# Patient Record
Sex: FEMALE | Race: WHITE | NOT HISPANIC OR LATINO | Employment: OTHER | URBAN - METROPOLITAN AREA
[De-identification: names, ages, dates, MRNs, and addresses within clinical notes are randomized per-mention and may not be internally consistent; named-entity substitution may affect disease eponyms.]

---

## 2017-01-11 ENCOUNTER — RECORDS - HEALTHEAST (OUTPATIENT)
Dept: LAB | Facility: CLINIC | Age: 82
End: 2017-01-11

## 2017-01-11 LAB
AST SERPL W P-5'-P-CCNC: 23 U/L (ref 0–40)
CHOLEST SERPL-MCNC: 134 MG/DL
FASTING STATUS PATIENT QL REPORTED: ABNORMAL
HDLC SERPL-MCNC: 39 MG/DL
LDLC SERPL CALC-MCNC: 73 MG/DL
TRIGL SERPL-MCNC: 110 MG/DL

## 2017-02-27 ENCOUNTER — HOSPITAL ENCOUNTER (OUTPATIENT)
Dept: MAMMOGRAPHY | Facility: HOSPITAL | Age: 82
Discharge: HOME | End: 2017-02-27
Attending: OBSTETRICS & GYNECOLOGY

## 2017-02-27 DIAGNOSIS — Z12.31 VISIT FOR SCREENING MAMMOGRAM: ICD-10-CM

## 2017-07-10 ENCOUNTER — RECORDS - HEALTHEAST (OUTPATIENT)
Dept: LAB | Facility: CLINIC | Age: 82
End: 2017-07-10

## 2017-07-10 LAB
CHOLEST SERPL-MCNC: 134 MG/DL
FASTING STATUS PATIENT QL REPORTED: ABNORMAL
HDLC SERPL-MCNC: 33 MG/DL
LDLC SERPL CALC-MCNC: 78 MG/DL
TRIGL SERPL-MCNC: 116 MG/DL

## 2017-08-18 ENCOUNTER — RECORDS - HEALTHEAST (OUTPATIENT)
Dept: LAB | Facility: CLINIC | Age: 82
End: 2017-08-18

## 2017-08-18 LAB
CHOLEST SERPL-MCNC: 184 MG/DL
FASTING STATUS PATIENT QL REPORTED: ABNORMAL
HDLC SERPL-MCNC: 35 MG/DL
LDLC SERPL CALC-MCNC: 112 MG/DL
TRIGL SERPL-MCNC: 183 MG/DL

## 2017-10-13 ENCOUNTER — RECORDS - HEALTHEAST (OUTPATIENT)
Dept: LAB | Facility: CLINIC | Age: 82
End: 2017-10-13

## 2017-10-13 LAB
CHOLEST SERPL-MCNC: 153 MG/DL
FASTING STATUS PATIENT QL REPORTED: ABNORMAL
HDLC SERPL-MCNC: 41 MG/DL
LDLC SERPL CALC-MCNC: 82 MG/DL
TRIGL SERPL-MCNC: 149 MG/DL

## 2018-01-16 ENCOUNTER — RECORDS - HEALTHEAST (OUTPATIENT)
Dept: LAB | Facility: CLINIC | Age: 83
End: 2018-01-16

## 2018-01-16 LAB
CHOLEST SERPL-MCNC: 131 MG/DL
FASTING STATUS PATIENT QL REPORTED: ABNORMAL
HDLC SERPL-MCNC: 42 MG/DL
LDLC SERPL CALC-MCNC: 64 MG/DL
TRIGL SERPL-MCNC: 126 MG/DL
TSH SERPL DL<=0.005 MIU/L-ACNC: 4.56 UIU/ML (ref 0.3–5)

## 2018-02-28 ENCOUNTER — HOSPITAL ENCOUNTER (OUTPATIENT)
Dept: MAMMOGRAPHY | Facility: HOSPITAL | Age: 83
Discharge: HOME | End: 2018-02-28
Attending: FAMILY MEDICINE

## 2018-02-28 DIAGNOSIS — Z12.31 VISIT FOR SCREENING MAMMOGRAM: ICD-10-CM

## 2018-08-08 ENCOUNTER — RECORDS - HEALTHEAST (OUTPATIENT)
Dept: LAB | Facility: CLINIC | Age: 83
End: 2018-08-08

## 2018-08-08 LAB
CHOLEST SERPL-MCNC: 147 MG/DL
FASTING STATUS PATIENT QL REPORTED: ABNORMAL
HDLC SERPL-MCNC: 41 MG/DL
LDLC SERPL CALC-MCNC: 74 MG/DL
TRIGL SERPL-MCNC: 160 MG/DL

## 2019-01-08 ENCOUNTER — RECORDS - HEALTHEAST (OUTPATIENT)
Dept: LAB | Facility: CLINIC | Age: 84
End: 2019-01-08

## 2019-01-08 LAB
ANION GAP SERPL CALCULATED.3IONS-SCNC: 12 MMOL/L (ref 5–18)
BUN SERPL-MCNC: 22 MG/DL (ref 8–28)
CALCIUM SERPL-MCNC: 9.7 MG/DL (ref 8.5–10.5)
CHLORIDE BLD-SCNC: 107 MMOL/L (ref 98–107)
CO2 SERPL-SCNC: 24 MMOL/L (ref 22–31)
CREAT SERPL-MCNC: 0.8 MG/DL (ref 0.6–1.1)
GFR SERPL CREATININE-BSD FRML MDRD: >60 ML/MIN/1.73M2
GLUCOSE BLD-MCNC: 123 MG/DL (ref 70–125)
MAGNESIUM SERPL-MCNC: 2.4 MG/DL (ref 1.8–2.6)
POTASSIUM BLD-SCNC: 4.3 MMOL/L (ref 3.5–5)
SODIUM SERPL-SCNC: 143 MMOL/L (ref 136–145)

## 2019-01-23 ENCOUNTER — RECORDS - HEALTHEAST (OUTPATIENT)
Dept: LAB | Facility: CLINIC | Age: 84
End: 2019-01-23

## 2019-01-23 LAB
CHOLEST SERPL-MCNC: 140 MG/DL
FASTING STATUS PATIENT QL REPORTED: YES
HDLC SERPL-MCNC: 43 MG/DL
LDLC SERPL CALC-MCNC: 67 MG/DL
TRIGL SERPL-MCNC: 152 MG/DL
TSH SERPL DL<=0.005 MIU/L-ACNC: 4.77 UIU/ML (ref 0.3–5)

## 2020-01-24 ENCOUNTER — RECORDS - HEALTHEAST (OUTPATIENT)
Dept: LAB | Facility: CLINIC | Age: 85
End: 2020-01-24

## 2020-01-24 LAB
ANION GAP SERPL CALCULATED.3IONS-SCNC: 8 MMOL/L (ref 5–18)
BUN SERPL-MCNC: 20 MG/DL (ref 8–28)
CALCIUM SERPL-MCNC: 9.5 MG/DL (ref 8.5–10.5)
CHLORIDE BLD-SCNC: 109 MMOL/L (ref 98–107)
CHOLEST SERPL-MCNC: 136 MG/DL
CO2 SERPL-SCNC: 28 MMOL/L (ref 22–31)
CREAT SERPL-MCNC: 0.79 MG/DL (ref 0.6–1.1)
FASTING STATUS PATIENT QL REPORTED: YES
GFR SERPL CREATININE-BSD FRML MDRD: >60 ML/MIN/1.73M2
GLUCOSE BLD-MCNC: 91 MG/DL (ref 70–125)
HDLC SERPL-MCNC: 41 MG/DL
LDLC SERPL CALC-MCNC: 63 MG/DL
POTASSIUM BLD-SCNC: 4.3 MMOL/L (ref 3.5–5)
SODIUM SERPL-SCNC: 145 MMOL/L (ref 136–145)
TRIGL SERPL-MCNC: 162 MG/DL
TSH SERPL DL<=0.005 MIU/L-ACNC: 3.7 UIU/ML (ref 0.3–5)

## 2020-02-03 ENCOUNTER — HOSPITAL ENCOUNTER (OUTPATIENT)
Dept: MAMMOGRAPHY | Facility: CLINIC | Age: 85
Discharge: HOME | End: 2020-02-03
Attending: FAMILY MEDICINE

## 2020-02-03 DIAGNOSIS — Z12.31 VISIT FOR SCREENING MAMMOGRAM: ICD-10-CM

## 2020-07-30 ENCOUNTER — RECORDS - HEALTHEAST (OUTPATIENT)
Dept: LAB | Facility: CLINIC | Age: 85
End: 2020-07-30

## 2020-07-30 LAB
CHOLEST SERPL-MCNC: 136 MG/DL
FASTING STATUS PATIENT QL REPORTED: YES
HDLC SERPL-MCNC: 41 MG/DL
LDLC SERPL CALC-MCNC: 62 MG/DL
TRIGL SERPL-MCNC: 163 MG/DL

## 2020-10-27 PROBLEM — M23.203 OLD COMPLEX TEAR OF MEDIAL MENISCUS OF RIGHT KNEE: Status: ACTIVE | Noted: 2020-10-27

## 2020-10-27 PROBLEM — G47.33 OBSTRUCTIVE SLEEP APNEA SYNDROME: Status: ACTIVE | Noted: 2020-10-27

## 2020-10-27 PROBLEM — Z85.43 HISTORY OF MALIGNANT NEOPLASM OF OVARY: Status: ACTIVE | Noted: 2020-10-27

## 2020-10-27 PROBLEM — R03.0 ELEVATED BLOOD PRESSURE READING WITHOUT DIAGNOSIS OF HYPERTENSION: Status: ACTIVE | Noted: 2020-10-27

## 2020-10-27 PROBLEM — L65.9 ALOPECIA: Status: ACTIVE | Noted: 2020-10-27

## 2020-10-27 PROBLEM — Z86.73 HISTORY OF STROKE WITHOUT RESIDUAL DEFICITS: Status: ACTIVE | Noted: 2020-10-27

## 2020-10-27 PROBLEM — G45.9 TIA (TRANSIENT ISCHEMIC ATTACK): Status: ACTIVE | Noted: 2019-01-03

## 2020-10-27 PROBLEM — M94.20 CHONDROMALACIA: Status: ACTIVE | Noted: 2020-10-27

## 2020-10-27 PROBLEM — M71.21 SYNOVIAL CYST OF POPLITEAL SPACE (BAKER), RIGHT KNEE: Status: ACTIVE | Noted: 2020-10-27

## 2020-10-27 PROBLEM — Z85.828 HISTORY OF SQUAMOUS CELL CARCINOMA OF SKIN: Status: ACTIVE | Noted: 2020-10-27

## 2020-10-27 PROBLEM — E78.2 MIXED HYPERLIPIDEMIA: Status: ACTIVE | Noted: 2020-10-27

## 2020-10-27 PROBLEM — B35.1 ONYCHOMYCOSIS: Status: ACTIVE | Noted: 2020-10-27

## 2020-10-27 PROBLEM — R41.82 AMS (ALTERED MENTAL STATUS): Status: ACTIVE | Noted: 2020-02-07

## 2020-10-27 PROBLEM — I35.9 AORTIC VALVE DISORDER: Status: ACTIVE | Noted: 2020-10-27

## 2020-10-27 PROBLEM — M81.0 SENILE OSTEOPOROSIS: Status: ACTIVE | Noted: 2020-10-27

## 2020-10-27 PROBLEM — E03.9 ACQUIRED HYPOTHYROIDISM: Status: ACTIVE | Noted: 2020-10-27

## 2020-10-27 SDOH — HEALTH STABILITY: MENTAL HEALTH: HOW OFTEN DO YOU HAVE 6 OR MORE DRINKS ON ONE OCCASION?: NOT ASKED

## 2020-10-27 SDOH — HEALTH STABILITY: MENTAL HEALTH: HOW OFTEN DO YOU HAVE A DRINK CONTAINING ALCOHOL?: MONTHLY OR LESS

## 2020-10-27 SDOH — HEALTH STABILITY: MENTAL HEALTH: HOW MANY STANDARD DRINKS CONTAINING ALCOHOL DO YOU HAVE ON A TYPICAL DAY?: NOT ASKED

## 2020-10-28 ENCOUNTER — VIRTUAL VISIT (OUTPATIENT)
Dept: NEUROLOGY | Facility: CLINIC | Age: 85
End: 2020-10-28
Payer: COMMERCIAL

## 2020-10-28 VITALS — BODY MASS INDEX: 25.11 KG/M2 | WEIGHT: 133 LBS | HEIGHT: 61 IN

## 2020-10-28 DIAGNOSIS — R03.0 ELEVATED BLOOD PRESSURE READING WITHOUT DIAGNOSIS OF HYPERTENSION: ICD-10-CM

## 2020-10-28 DIAGNOSIS — G45.4 TRANSIENT GLOBAL AMNESIA: Primary | ICD-10-CM

## 2020-10-28 PROCEDURE — 99214 OFFICE O/P EST MOD 30 MIN: CPT | Mod: 95 | Performed by: PSYCHIATRY & NEUROLOGY

## 2020-10-28 RX ORDER — ROSUVASTATIN CALCIUM 10 MG/1
10 TABLET, COATED ORAL
COMMUNITY
Start: 2019-01-11

## 2020-10-28 RX ORDER — LEVOTHYROXINE SODIUM 25 UG/1
25 TABLET ORAL
COMMUNITY

## 2020-10-28 RX ORDER — ASPIRIN 81 MG/1
81 TABLET, CHEWABLE ORAL DAILY
COMMUNITY

## 2020-10-28 RX ORDER — ALENDRONATE SODIUM 70 MG/1
TABLET ORAL
COMMUNITY
Start: 2020-09-15

## 2020-10-28 SDOH — HEALTH STABILITY: MENTAL HEALTH: HOW OFTEN DO YOU HAVE A DRINK CONTAINING ALCOHOL?: NOT ASKED

## 2020-10-28 ASSESSMENT — MIFFLIN-ST. JEOR: SCORE: 967.72

## 2020-10-28 NOTE — LETTER
"    10/28/2020         RE: Tiarra Alarcon  [address] [address]        Dear Colleague,    Thank you for referring your patient, Tiarra Alarcon, to the [facility] NEUROLOG[facility] [place]. Please see a copy of my visit note below.    Telephone follow-up visit requested by patient  Telephone follow-up visit due to the COVID-19 pandemic  Telephone number [phone]  Patient identified    .  The patient has been notified of following:     \"This telephone visit will be conducted via a call between you and your physician/provider. We have found that certain health care needs can be provided without the need for a physical exam. This service lets us provide the care you need with a short phone conversation. If a prescription is necessary we can send it directly to your pharmacy. If lab work is needed we can place an order for that and you can then stop by our lab to have the test done at a later time.    If during the course of the call the physician/provider feels a telephone visit is not appropriate, you will not be charged for this service.\"     Patient has given verbal consent for Telephone visit? Yes  Consent has been obtained for this service by 1 care team member: yes.              History of Present Illness     87-year-old seen in follow-up October 28, 2020 on a telephone follow-up visit    Patient had previous history of    1 patient had a TIA back in January 30, 2019 with a aphasia  2 past history of TGA in 2016  3 episode of hypertensive urgency with possible TGA February 7, 2020      Patient back in February had a transient episode where she had gone to the gym worked out drove home could not recall what she did was able to walk talk and do everything this would have been her second episode but she was also hypertensive at the time    She been tracking her blood pressure at home and says that it is good and she is not on any blood pressure medications     recent blood " pressures on October 2020 visit  137/76  131/72  134/75    Since last seen no hospitalizations no surgeries no major illnesses no falls or injuries no difficulty with memory recall eating okay    Current past medical history:    1. TIA on January 03, 2019, aphasia.  2. Hyperlipidemia.  3. Ovarian cancer post chemotherapy in 1992.  4. Transient global amnesia spell in the past. 2016  5. Hypertensive urgency with TGA February 2020  Habits:    a. Nonsmoker.  b. Does not drink alcohol.    Family history:    1. Sister with breast cancer, age 70.  2. Sister with cancer, age 89.  3. Daughter with breast cancer, age 57.    Review of workup during the January 2019 hospitalization:    1. MRI scan of the brain showed no acute stroke. There were some vessel changes seen.  2. CT scan of the head:  a. Diffuse volume loss, mild ex vacuo ventricular dilatation.  b. No bleed or hemorrhage or mass.  3. CTA of the intracranial vessels and neck vessels were unremarkable.  4. Echo 57% ejection fraction, mild mitral regurgitation, left atrium normal size.  5. HDL 39, LDL of 75.      Work-up Feb 2020  MRI scan brain  A. Mild atrophy and small vessel changes  B. Volume loss is a little bit more prominent in the parietal lobes  MRA [place] no significant stenosis  EKG normal sinus rhythm  Glucose 86        She has some mild anxiety which is chronic in nature and some difficulty with sleep.    Her mother lived to be 93, father lived to be 94. Sister lived to be 87.            Currently, she has:    1. Aphasia, TIA left hemisphere.  a. 81 mg of aspirin once per day.  b. Crestor 10 mg once per day.  c. Follow up blood pressure with her primary.  2. Some anxiety with maybe reactive blood pressure, which she should follow with her primary for, and keep tracking it at different times of the day.  3. History of thyroid disease, past history of ovarian cancer.  4. Previous transient global amnesia. 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)  Continue 81 mg of aspirin once per day  Continue Crestor 10 mg once per day  Blood pressure was good with home readings reviewed with patient  TGA x2 Review of Systems No headache no chest pain no shortness of breath no nausea vomiting no diarrhea no fever chills  No diplopia dysarthria dysphasia  No focal weakness numbness or tingling  No ataxia  No confusion or memory loss    Otherwise review of systems negative     Exam  Blood pressure 134/75 taken on home blood pressure cuff  Moving air well  No edema the feet  No abdominal pain    Neurologic exam  Alert oriented x3  No aphasia  No neglect  Memory recall good    Cranial nerves by report are normal  No focal weakness of the upper or lower extremities  Gait normal by report    Exam stable            Assessment/Plan   Brain 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 Aphasia, TIA left hemisphere.  a. 81 mg of aspirin once per day.  b. Crestor 10 mg once per day.  c. Follow up blood pressure with her primary.  2. Some anxiety with maybe reactive blood pressure, which she should follow with her primary for, and keep tracking it at different times of the day.  3. History of thyroid disease, past history of ovarian cancer.  4. Previous transient global amnesia. 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        Sincerely,        Kurt Rivers  MD [place]

## 2020-10-28 NOTE — PROGRESS NOTES
"Telephone follow-up visit requested by patient  Telephone follow-up visit due to the COVID-19 pandemic  Telephone number [phone]  Patient identified    .  The patient has been notified of following:     \"This telephone visit will be conducted via a call between you and your physician/provider. We have found that certain health care needs can be provided without the need for a physical exam. This service lets us provide the care you need with a short phone conversation. If a prescription is necessary we can send it directly to your pharmacy. If lab work is needed we can place an order for that and you can then stop by our lab to have the test done at a later time.    If during the course of the call the physician/provider feels a telephone visit is not appropriate, you will not be charged for this service.\"     Patient has given verbal consent for Telephone visit? Yes  Consent has been obtained for this service by 1 care team member: yes.              History of Present Illness     87-year-old seen in follow-up October 28, 2020 on a telephone follow-up visit    Patient had previous history of    1 patient had a TIA back in January 30, 2019 with a aphasia  2 past history of TGA in 2016  3 episode of hypertensive urgency with possible TGA February 7, 2020      Patient back in February had a transient episode where she had gone to the gym worked out drove home could not recall what she did was able to walk talk and do everything this would have been her second episode but she was also hypertensive at the time    She been tracking her blood pressure at home and says that it is good and she is not on any blood pressure medications     recent blood pressures on October 2020 visit  137/76  131/72  134/75    Since last seen no hospitalizations no surgeries no major illnesses no falls or injuries no difficulty with memory recall eating okay    Current past medical history:    1. TIA on January 03, 2019, aphasia.  2. " Hyperlipidemia.  3. Ovarian cancer post chemotherapy in 1992.  4. Transient global amnesia spell in the past. 2016  5. Hypertensive urgency with TGA February 2020  Habits:    a. Nonsmoker.  b. Does not drink alcohol.    Family history:    1. Sister with breast cancer, age 70.  2. Sister with cancer, age 89.  3. Daughter with breast cancer, age 57.    Review of workup during the January 2019 hospitalization:    1. MRI scan of the brain showed no acute stroke. There were some vessel changes seen.  2. CT scan of the head:  a. Diffuse volume loss, mild ex vacuo ventricular dilatation.  b. No bleed or hemorrhage or mass.  3. CTA of the intracranial vessels and neck vessels were unremarkable.  4. Echo 57% ejection fraction, mild mitral regurgitation, left atrium normal size.  5. HDL 39, LDL of 75.      Work-up Feb 2020  MRI scan brain  A. Mild atrophy and small vessel changes  B. Volume loss is a little bit more prominent in the parietal lobes  MRA [place] no significant stenosis  EKG normal sinus rhythm  Glucose 86        She has some mild anxiety which is chronic in nature and some difficulty with sleep.    Her mother lived to be 93, father lived to be 94. Sister lived to be 87.            Currently, she has:    1. Aphasia, TIA left hemisphere.  a. 81 mg of aspirin once per day.  b. Crestor 10 mg once per day.  c. Follow up blood pressure with her primary.  2. Some anxiety with maybe reactive blood pressure, which she should follow with her primary for, and keep tracking it at different times of the day.  3. History of thyroid disease, past history of ovarian cancer.  4. Previous transient global amnesia. 2016 with repeat spell February 2020        Assessment/Plan  Brain TIA (G46.0)  Continue 81 mg of aspirin once per day  Continue Crestor 10 mg once per day  Blood pressure was good with home readings reviewed with patient  TGA x2 Review of Systems No headache no chest pain no shortness of breath no nausea  vomiting no diarrhea no fever chills  No diplopia dysarthria dysphasia  No focal weakness numbness or tingling  No ataxia  No confusion or memory loss    Otherwise review of systems negative     Exam  Blood pressure 134/75 taken on home blood pressure cuff  Moving air well  No edema the feet  No abdominal pain    Neurologic exam  Alert oriented x3  No aphasia  No neglect  Memory recall good    Cranial nerves by report are normal  No focal weakness of the upper or lower extremities  Gait normal by report    Exam stable            Assessment/Plan   Brain 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 Aphasia, TIA left hemisphere.  a. 81 mg of aspirin once per day.  b. Crestor 10 mg once per day.  c. Follow up blood pressure with her primary.  2. Some anxiety with maybe reactive blood pressure, which she should follow with her primary for, and keep tracking it at different times of the day.  3. History of thyroid disease, past history of ovarian cancer.  4. Previous transient global amnesia. 2016 with repeat spell February 2020        Assessment/Plan  Brain TIA (G46.0)  Continue 81 mg of aspirin once per day  Continue Crestor 10 mg once per day      25 minutes of discussion time over the phone was carried out today as part of the visit

## 2020-10-28 NOTE — NURSING NOTE
Chief Complaint   Patient presents with     Follow Up     Follow up for TIA. Pt states she has been doing well. No concerns she would needs to discuss.     Phone visit     [phone]     Shannan Xiao LPN on 10/28/2020 at 9:49 AM

## 2021-01-25 ENCOUNTER — RECORDS - HEALTHEAST (OUTPATIENT)
Dept: LAB | Facility: CLINIC | Age: 86
End: 2021-01-25

## 2021-01-25 LAB
ANION GAP SERPL CALCULATED.3IONS-SCNC: 8 MMOL/L (ref 5–18)
BASOPHILS # BLD AUTO: 0.1 THOU/UL (ref 0–0.2)
BASOPHILS NFR BLD AUTO: 1 % (ref 0–2)
BUN SERPL-MCNC: 22 MG/DL (ref 8–28)
CALCIUM SERPL-MCNC: 9.2 MG/DL (ref 8.5–10.5)
CHLORIDE BLD-SCNC: 108 MMOL/L (ref 98–107)
CHOLEST SERPL-MCNC: 132 MG/DL
CO2 SERPL-SCNC: 27 MMOL/L (ref 22–31)
CREAT SERPL-MCNC: 0.79 MG/DL (ref 0.6–1.1)
EOSINOPHIL # BLD AUTO: 0.2 THOU/UL (ref 0–0.4)
EOSINOPHIL NFR BLD AUTO: 4 % (ref 0–6)
ERYTHROCYTE [DISTWIDTH] IN BLOOD BY AUTOMATED COUNT: 12.2 % (ref 11–14.5)
FASTING STATUS PATIENT QL REPORTED: YES
GFR SERPL CREATININE-BSD FRML MDRD: >60 ML/MIN/1.73M2
GLUCOSE BLD-MCNC: 99 MG/DL (ref 70–125)
HCT VFR BLD AUTO: 44.2 % (ref 35–47)
HDLC SERPL-MCNC: 39 MG/DL
HGB BLD-MCNC: 13.8 G/DL (ref 12–16)
IMM GRANULOCYTES # BLD: 0 THOU/UL
IMM GRANULOCYTES NFR BLD: 0 %
LDLC SERPL CALC-MCNC: 63 MG/DL
LYMPHOCYTES # BLD AUTO: 1.4 THOU/UL (ref 0.8–4.4)
LYMPHOCYTES NFR BLD AUTO: 25 % (ref 20–40)
MCH RBC QN AUTO: 30.1 PG (ref 27–34)
MCHC RBC AUTO-ENTMCNC: 31.2 G/DL (ref 32–36)
MCV RBC AUTO: 96 FL (ref 80–100)
MONOCYTES # BLD AUTO: 0.5 THOU/UL (ref 0–0.9)
MONOCYTES NFR BLD AUTO: 10 % (ref 2–10)
NEUTROPHILS # BLD AUTO: 3.4 THOU/UL (ref 2–7.7)
NEUTROPHILS NFR BLD AUTO: 61 % (ref 50–70)
PLATELET # BLD AUTO: 199 THOU/UL (ref 140–440)
PMV BLD AUTO: 10.1 FL (ref 8.5–12.5)
POTASSIUM BLD-SCNC: 4.5 MMOL/L (ref 3.5–5)
RBC # BLD AUTO: 4.59 MILL/UL (ref 3.8–5.4)
SODIUM SERPL-SCNC: 143 MMOL/L (ref 136–145)
TRIGL SERPL-MCNC: 152 MG/DL
TSH SERPL DL<=0.005 MIU/L-ACNC: 3.9 UIU/ML (ref 0.3–5)
WBC: 5.6 THOU/UL (ref 4–11)

## 2021-05-14 ENCOUNTER — OFFICE VISIT (OUTPATIENT)
Dept: NEUROLOGY | Facility: CLINIC | Age: 86
End: 2021-05-14
Payer: COMMERCIAL

## 2021-05-14 VITALS
DIASTOLIC BLOOD PRESSURE: 102 MMHG | WEIGHT: 137 LBS | HEART RATE: 76 BPM | BODY MASS INDEX: 26.9 KG/M2 | HEIGHT: 60 IN | SYSTOLIC BLOOD PRESSURE: 176 MMHG

## 2021-05-14 DIAGNOSIS — G45.9 TIA (TRANSIENT ISCHEMIC ATTACK): ICD-10-CM

## 2021-05-14 DIAGNOSIS — R03.0 ELEVATED BLOOD PRESSURE READING WITHOUT DIAGNOSIS OF HYPERTENSION: ICD-10-CM

## 2021-05-14 DIAGNOSIS — G45.4 TRANSIENT GLOBAL AMNESIA: Primary | ICD-10-CM

## 2021-05-14 PROCEDURE — 99214 OFFICE O/P EST MOD 30 MIN: CPT | Performed by: PSYCHIATRY & NEUROLOGY

## 2021-05-14 RX ORDER — ASPIRIN 81 MG
1 TABLET, DELAYED RELEASE (ENTERIC COATED) ORAL
COMMUNITY

## 2021-05-14 ASSESSMENT — MIFFLIN-ST. JEOR: SCORE: 972.93

## 2021-05-14 NOTE — LETTER
5/14/2021         RE: Tiarra Alarcon  [address] [address]        Dear Colleague,    Thank you for referring your patient, Tiarra Alarcon, to the [facility] NEUROLOGY [facility] [place]. Please see a copy of my visit note below.    In person evaluation  Patient accompanied by       History of Present Illness :  3/16/2020, in person visit  10/28/2020, telephone visit  5/14/2021, in person visit    88-year-old here for neurologic follow-up    Patient had previous history of:  1 patient had a TIA back in January 30, 2019 with a aphasia  2 past history of TGA in 2016  3 episode of hypertensive urgency with possible TGA February 7, 2020      Patient back in February 2020, had a transient episode where she had gone to the gym worked out drove home could not recall what she did was able to walk talk and do everything this would have been her second episode but she was also hypertensive at the time    She been tracking her blood pressure at home and says that it is good and she is not on any blood pressure medications     recent blood pressures on October 2020 visit  137/76  131/72  134/75    Since last seen no hospitalizations no surgeries no major illnesses no falls or injuries no difficulty with memory recall eating okay    Since last visit  No hospitalizations  No surgeries  No major illnesses  No Covid infection    She takes her blood pressure at home runs 126/70 goes up when she comes to the doctor's office          Current past medical history:    1. TIA on January 03, 2019, aphasia.  2. Hyperlipidemia.  3. Ovarian cancer post chemotherapy in 1992.  4. Transient global amnesia spell in the past. 2016  5. Hypertensive urgency with TGA February 2020  6. She has some mild anxiety which is chronic in nature and some difficulty with sleep.    Habits:  a. Nonsmoker.  b. Does not drink alcohol.    Family history:  1. Sister with breast cancer, age 70.  2. Sister with cancer, age  89.  3. Daughter with breast cancer, age 57.  4.Her mother lived to be 93, father lived to be 94. Sister lived to be 87.      Review of workup during the January 2019 hospitalization:    1. MRI scan of the brain showed no acute stroke. There were some vessel changes seen.  2. CT scan of the head:  a. Diffuse volume loss, mild ex vacuo ventricular dilatation.  b. No bleed or hemorrhage or mass.  3. CTA of the intracranial vessels and neck vessels were unremarkable.  4. Echo 57% ejection fraction, mild mitral regurgitation, left atrium normal size.  5. HDL 39, LDL of 75.      Work-up Feb 2020  MRI scan brain 2/7/2020  A. Mild atrophy and small vessel changes  B. Volume loss is a little bit more prominent in the parietal lobes  MRA [place] no significant stenosis  EKG normal sinus rhythm  Glucose 86     labs January 2021  Sodium 143 potassium 4.5, BUN 22 creatinine 0.79  Glucose 99  White blood count 5.6, hemoglobin 13.8, platelets 109,000  TSH 3.9  HDL 39/LDL 63                 exam    Review of systems  No headache no chest pain no shortness of breath no nausea vomiting no diarrhea no fever chills  No abdominal pain  No blood in the stool  No black tarry stool  No GERD    No diplopia dysarthria dysphagia  No focal weakness  No aphasia  No difficulty with gait  Some trouble getting off the ground when she gardens    Otherwise review of systems negative    Exam  Blood pressure 176/102 pulse of 76  At home patient states blood pressure is 126/70  States that it always goes up at the doctor's office tracks it well at home    HEENT normal  Lungs clear  Heart rate regular  Abdomen soft  Symmetrical pulses  No edema the feet    Neurologic exam  Alert oriented x3  Normal prosody of speech  Normal naming  Normal repetition  Normal comprehension  No neglect  No aphasia  Memory recall good    President is [place]   is Yg  It is 3 out of 3 words in 5 minutes with lots of distractions in between  Jumps  from topic to topic easily    Cranial 2 through 12 normal  No ophthalmoplegia  No nystagmus  Visual fields intact  Face symmetrical  Tongue twisters good    Upper extremities  No drift  No tremor  Normal finger-nose    Lower extremities  Distal proximal strength good    Gait  Except in the chair with her arms crossed  Ambulates out to the sink turns comes back no difficulty with posture balance or gait sasha            Assessment/Plan     1.  Brain TIA (G46.0)         Aspirin 81 mg once per day        Crestor 10 mg once per day        Monitor home blood pressure has been doing and they have been good         She says is only high when she goes to the dentist with a doctor    2.  We discussed that she has had 2 episodes of TGA February 2020 and in 2016      Currently, she has:    1. Aphasia, TIA left hemisphere.  a. 81 mg of aspirin once per day.  b. Crestor 10 mg once per day.  c. Follow up blood pressure with her primary.  2. Some anxiety with maybe reactive blood pressure, which she should follow with her primary for, and keep tracking it at different times of the day.  3. History of thyroid disease, past history of ovarian cancer.  4. Previous transient global amnesia. 2016 with repeat spell February 2020        Assessment/Plan  Brain TIA (G46.0)  Continue 81 mg of aspirin once per day  Continue Crestor 10 mg once per day  HDL 39/LDL 63, (January 2021)    Patient will follow up on a yearly basis  We will track blood pressure at home  Follow-up more closely with her primary MD    Total care time today 34 minutes  As part of the visit today  Reviewed past MRI scan 2/7/2020  Reviewed labs from January 2021  Reviewed ED note from February 7, 2020  Reviewed past work-up      Again, thank you for allowing me to participate in the care of your patient.        Sincerely,        Kurt Maurice MD

## 2021-05-14 NOTE — PROGRESS NOTES
In person evaluation  Patient accompanied by       History of Present Illness :  3/16/2020, in person visit  10/28/2020, telephone visit  5/14/2021, in person visit    88-year-old here for neurologic follow-up    Patient had previous history of:  1 patient had a TIA back in January 30, 2019 with a aphasia  2 past history of TGA in 2016  3 episode of hypertensive urgency with possible TGA February 7, 2020      Patient back in February 2020, had a transient episode where she had gone to the gym worked out drove home could not recall what she did was able to walk talk and do everything this would have been her second episode but she was also hypertensive at the time    She been tracking her blood pressure at home and says that it is good and she is not on any blood pressure medications     recent blood pressures on October 2020 visit  137/76  131/72  134/75    Since last seen no hospitalizations no surgeries no major illnesses no falls or injuries no difficulty with memory recall eating okay    Since last visit  No hospitalizations  No surgeries  No major illnesses  No Covid infection    She takes her blood pressure at home runs 126/70 goes up when she comes to the doctor's office          Current past medical history:    1. TIA on January 03, 2019, aphasia.  2. Hyperlipidemia.  3. Ovarian cancer post chemotherapy in 1992.  4. Transient global amnesia spell in the past. 2016  5. Hypertensive urgency with TGA February 2020  6. She has some mild anxiety which is chronic in nature and some difficulty with sleep.    Habits:  a. Nonsmoker.  b. Does not drink alcohol.    Family history:  1. Sister with breast cancer, age 70.  2. Sister with cancer, age 89.  3. Daughter with breast cancer, age 57.  4.Her mother lived to be 93, father lived to be 94. Sister lived to be 87.      Review of workup during the January 2019 hospitalization:    1. MRI scan of the brain showed no acute stroke. There were some vessel changes  seen.  2. CT scan of the head:  a. Diffuse volume loss, mild ex vacuo ventricular dilatation.  b. No bleed or hemorrhage or mass.  3. CTA of the intracranial vessels and neck vessels were unremarkable.  4. Echo 57% ejection fraction, mild mitral regurgitation, left atrium normal size.  5. HDL 39, LDL of 75.      Work-up Feb 2020  MRI scan brain 2/7/2020  A. Mild atrophy and small vessel changes  B. Volume loss is a little bit more prominent in the parietal lobes  MRA [place] no significant stenosis  EKG normal sinus rhythm  Glucose 86     labs January 2021  Sodium 143 potassium 4.5, BUN 22 creatinine 0.79  Glucose 99  White blood count 5.6, hemoglobin 13.8, platelets 109,000  TSH 3.9  HDL 39/LDL 63                 exam    Review of systems  No headache no chest pain no shortness of breath no nausea vomiting no diarrhea no fever chills  No abdominal pain  No blood in the stool  No black tarry stool  No GERD    No diplopia dysarthria dysphagia  No focal weakness  No aphasia  No difficulty with gait  Some trouble getting off the ground when she gardens    Otherwise review of systems negative    Exam  Blood pressure 176/102 pulse of 76  At home patient states blood pressure is 126/70  States that it always goes up at the doctor's office tracks it well at home    HEENT normal  Lungs clear  Heart rate regular  Abdomen soft  Symmetrical pulses  No edema the feet    Neurologic exam  Alert oriented x3  Normal prosody of speech  Normal naming  Normal repetition  Normal comprehension  No neglect  No aphasia  Memory recall good    President is [place]   is [place]  It is 3 out of 3 words in 5 minutes with lots of distractions in between  Jumps from topic to topic easily    Cranial 2 through 12 normal  No ophthalmoplegia  No nystagmus  Visual fields intact  Face symmetrical  Tongue twisters good    Upper extremities  No drift  No tremor  Normal finger-nose    Lower extremities  Distal proximal strength  good    Gait  Except in the chair with her arms crossed  Ambulates out to the sink turns comes back no difficulty with posture balance or gait sasha            Assessment/Plan     1.  Brain TIA (G46.0)         Aspirin 81 mg once per day        Crestor 10 mg once per day        Monitor home blood pressure has been doing and they have been good         She says is only high when she goes to the dentist with a doctor    2.  We discussed that she has had 2 episodes of TGA February 2020 and in 2016      Currently, she has:    1. Aphasia, TIA left hemisphere.  a. 81 mg of aspirin once per day.  b. Crestor 10 mg once per day.  c. Follow up blood pressure with her primary.  2. Some anxiety with maybe reactive blood pressure, which she should follow with her primary for, and keep tracking it at different times of the day.  3. History of thyroid disease, past history of ovarian cancer.  4. Previous transient global amnesia. 2016 with repeat spell February 2020        Assessment/Plan  Brain TIA (G46.0)  Continue 81 mg of aspirin once per day  Continue Crestor 10 mg once per day  HDL 39/LDL 63, (January 2021)    Patient will follow up on a yearly basis  We will track blood pressure at home  Follow-up more closely with her primary MD    Total care time today 34 minutes  As part of the visit today  Reviewed past MRI scan 2/7/2020  Reviewed labs from January 2021  Reviewed ED note from February 7, 2020  Reviewed past work-up

## 2021-05-14 NOTE — NURSING NOTE
Chief Complaint   Patient presents with     Follow Up     Pt states she is doing well. No concerns.     Shannan Xiao LPN on 5/14/2021 at 11:28 AM

## 2021-05-24 ENCOUNTER — RECORDS - HEALTHEAST (OUTPATIENT)
Dept: ADMINISTRATIVE | Facility: CLINIC | Age: 86
End: 2021-05-24

## 2021-05-25 ENCOUNTER — RECORDS - HEALTHEAST (OUTPATIENT)
Dept: ADMINISTRATIVE | Facility: CLINIC | Age: 86
End: 2021-05-25

## 2021-05-26 ENCOUNTER — RECORDS - HEALTHEAST (OUTPATIENT)
Dept: ADMINISTRATIVE | Facility: CLINIC | Age: 86
End: 2021-05-26

## 2021-05-27 ENCOUNTER — RECORDS - HEALTHEAST (OUTPATIENT)
Dept: ADMINISTRATIVE | Facility: CLINIC | Age: 86
End: 2021-05-27

## 2021-05-28 ENCOUNTER — RECORDS - HEALTHEAST (OUTPATIENT)
Dept: ADMINISTRATIVE | Facility: CLINIC | Age: 86
End: 2021-05-28

## 2021-05-29 ENCOUNTER — RECORDS - HEALTHEAST (OUTPATIENT)
Dept: ADMINISTRATIVE | Facility: CLINIC | Age: 86
End: 2021-05-29

## 2021-05-30 ENCOUNTER — RECORDS - HEALTHEAST (OUTPATIENT)
Dept: ADMINISTRATIVE | Facility: CLINIC | Age: 86
End: 2021-05-30

## 2021-06-03 ENCOUNTER — RECORDS - HEALTHEAST (OUTPATIENT)
Dept: ADMINISTRATIVE | Facility: CLINIC | Age: 86
End: 2021-06-03

## 2021-07-21 ENCOUNTER — RECORDS - HEALTHEAST (OUTPATIENT)
Dept: ADMINISTRATIVE | Facility: CLINIC | Age: 86
End: 2021-07-21

## 2021-07-29 ENCOUNTER — LAB REQUISITION (OUTPATIENT)
Dept: LAB | Facility: CLINIC | Age: 86
End: 2021-07-29
Payer: COMMERCIAL

## 2021-07-29 DIAGNOSIS — E78.2 MIXED HYPERLIPIDEMIA: ICD-10-CM

## 2021-07-29 LAB
CHOLEST SERPL-MCNC: 143 MG/DL
FASTING STATUS PATIENT QL REPORTED: ABNORMAL
HDLC SERPL-MCNC: 39 MG/DL
LDLC SERPL CALC-MCNC: 70 MG/DL
TRIGL SERPL-MCNC: 170 MG/DL

## 2021-07-29 PROCEDURE — 80061 LIPID PANEL: CPT | Mod: ORL | Performed by: FAMILY MEDICINE

## 2021-07-31 ENCOUNTER — HEALTH MAINTENANCE LETTER (OUTPATIENT)
Age: 86
End: 2021-07-31

## 2021-09-25 ENCOUNTER — HEALTH MAINTENANCE LETTER (OUTPATIENT)
Age: 86
End: 2021-09-25

## 2022-02-09 ENCOUNTER — LAB REQUISITION (OUTPATIENT)
Dept: LAB | Facility: CLINIC | Age: 87
End: 2022-02-09
Payer: COMMERCIAL

## 2022-02-09 DIAGNOSIS — E78.2 MIXED HYPERLIPIDEMIA: ICD-10-CM

## 2022-02-09 DIAGNOSIS — R03.0 ELEVATED BLOOD-PRESSURE READING, WITHOUT DIAGNOSIS OF HYPERTENSION: ICD-10-CM

## 2022-02-09 DIAGNOSIS — Z13.9 ENCOUNTER FOR SCREENING, UNSPECIFIED: ICD-10-CM

## 2022-02-09 LAB
ANION GAP SERPL CALCULATED.3IONS-SCNC: 11 MMOL/L (ref 5–18)
BUN SERPL-MCNC: 21 MG/DL (ref 8–28)
CALCIUM SERPL-MCNC: 9.6 MG/DL (ref 8.5–10.5)
CHLORIDE BLD-SCNC: 108 MMOL/L (ref 98–107)
CHOLEST SERPL-MCNC: 135 MG/DL
CO2 SERPL-SCNC: 24 MMOL/L (ref 22–31)
CREAT SERPL-MCNC: 0.82 MG/DL (ref 0.6–1.1)
FASTING STATUS PATIENT QL REPORTED: ABNORMAL
GFR SERPL CREATININE-BSD FRML MDRD: 68 ML/MIN/1.73M2
GLUCOSE BLD-MCNC: 92 MG/DL (ref 70–125)
HDLC SERPL-MCNC: 40 MG/DL
HGB BLD-MCNC: 13.4 G/DL (ref 11.7–15.7)
LDLC SERPL CALC-MCNC: 64 MG/DL
POTASSIUM BLD-SCNC: 4.1 MMOL/L (ref 3.5–5)
SODIUM SERPL-SCNC: 143 MMOL/L (ref 136–145)
TRIGL SERPL-MCNC: 154 MG/DL
TSH SERPL DL<=0.005 MIU/L-ACNC: 4.91 UIU/ML (ref 0.3–5)

## 2022-02-09 PROCEDURE — 80048 BASIC METABOLIC PNL TOTAL CA: CPT | Mod: ORL | Performed by: FAMILY MEDICINE

## 2022-02-09 PROCEDURE — 82306 VITAMIN D 25 HYDROXY: CPT | Mod: ORL | Performed by: FAMILY MEDICINE

## 2022-02-09 PROCEDURE — 84443 ASSAY THYROID STIM HORMONE: CPT | Mod: ORL | Performed by: FAMILY MEDICINE

## 2022-02-09 PROCEDURE — 85018 HEMOGLOBIN: CPT | Mod: ORL | Performed by: FAMILY MEDICINE

## 2022-02-09 PROCEDURE — 80061 LIPID PANEL: CPT | Mod: ORL | Performed by: FAMILY MEDICINE

## 2022-02-10 LAB — DEPRECATED CALCIDIOL+CALCIFEROL SERPL-MC: 59 UG/L (ref 30–80)

## 2022-08-03 ENCOUNTER — LAB REQUISITION (OUTPATIENT)
Dept: LAB | Facility: CLINIC | Age: 87
End: 2022-08-03
Payer: COMMERCIAL

## 2022-08-03 DIAGNOSIS — E03.9 HYPOTHYROIDISM, UNSPECIFIED: ICD-10-CM

## 2022-08-03 DIAGNOSIS — E78.2 MIXED HYPERLIPIDEMIA: ICD-10-CM

## 2022-08-03 LAB
ALBUMIN SERPL BCG-MCNC: 4.3 G/DL (ref 3.5–5.2)
ALP SERPL-CCNC: 61 U/L (ref 35–104)
ALT SERPL W P-5'-P-CCNC: 15 U/L (ref 10–35)
ANION GAP SERPL CALCULATED.3IONS-SCNC: 10 MMOL/L (ref 7–15)
AST SERPL W P-5'-P-CCNC: 22 U/L (ref 10–35)
BILIRUB SERPL-MCNC: 0.3 MG/DL
BUN SERPL-MCNC: 19.4 MG/DL (ref 8–23)
CALCIUM SERPL-MCNC: 9.3 MG/DL (ref 8.8–10.2)
CHLORIDE SERPL-SCNC: 107 MMOL/L (ref 98–107)
CHOLEST SERPL-MCNC: 133 MG/DL
CREAT SERPL-MCNC: 0.82 MG/DL (ref 0.51–0.95)
DEPRECATED HCO3 PLAS-SCNC: 25 MMOL/L (ref 22–29)
GFR SERPL CREATININE-BSD FRML MDRD: 68 ML/MIN/1.73M2
GLUCOSE SERPL-MCNC: 97 MG/DL (ref 70–99)
HDLC SERPL-MCNC: 41 MG/DL
LDLC SERPL CALC-MCNC: 59 MG/DL
NONHDLC SERPL-MCNC: 92 MG/DL
POTASSIUM SERPL-SCNC: 4.2 MMOL/L (ref 3.4–5.3)
PROT SERPL-MCNC: 6.5 G/DL (ref 6.4–8.3)
SODIUM SERPL-SCNC: 142 MMOL/L (ref 136–145)
TRIGL SERPL-MCNC: 166 MG/DL
TSH SERPL DL<=0.005 MIU/L-ACNC: 4.89 UIU/ML (ref 0.3–4.2)

## 2022-08-03 PROCEDURE — 84443 ASSAY THYROID STIM HORMONE: CPT | Mod: ORL | Performed by: FAMILY MEDICINE

## 2022-08-03 PROCEDURE — 80061 LIPID PANEL: CPT | Mod: ORL | Performed by: FAMILY MEDICINE

## 2022-08-03 PROCEDURE — 80053 COMPREHEN METABOLIC PANEL: CPT | Mod: ORL | Performed by: FAMILY MEDICINE

## 2022-08-21 ENCOUNTER — HEALTH MAINTENANCE LETTER (OUTPATIENT)
Age: 87
End: 2022-08-21

## 2022-10-21 ENCOUNTER — LAB REQUISITION (OUTPATIENT)
Dept: LAB | Facility: CLINIC | Age: 87
End: 2022-10-21
Payer: COMMERCIAL

## 2022-10-21 DIAGNOSIS — E03.9 HYPOTHYROIDISM, UNSPECIFIED: ICD-10-CM

## 2022-10-21 LAB
T4 FREE SERPL-MCNC: 1.03 NG/DL (ref 0.9–1.7)
TSH SERPL DL<=0.005 MIU/L-ACNC: 5.58 UIU/ML (ref 0.3–4.2)

## 2022-10-21 PROCEDURE — 84439 ASSAY OF FREE THYROXINE: CPT | Mod: ORL | Performed by: FAMILY MEDICINE

## 2022-10-21 PROCEDURE — 84443 ASSAY THYROID STIM HORMONE: CPT | Mod: ORL | Performed by: FAMILY MEDICINE

## 2022-12-26 ENCOUNTER — HEALTH MAINTENANCE LETTER (OUTPATIENT)
Age: 87
End: 2022-12-26

## 2023-02-13 ENCOUNTER — LAB REQUISITION (OUTPATIENT)
Dept: LAB | Facility: CLINIC | Age: 88
End: 2023-02-13
Payer: COMMERCIAL

## 2023-02-13 DIAGNOSIS — E03.9 HYPOTHYROIDISM, UNSPECIFIED: ICD-10-CM

## 2023-02-13 DIAGNOSIS — E78.2 MIXED HYPERLIPIDEMIA: ICD-10-CM

## 2023-02-13 LAB
ALBUMIN SERPL BCG-MCNC: 4.5 G/DL (ref 3.5–5.2)
ALP SERPL-CCNC: 64 U/L (ref 35–104)
ALT SERPL W P-5'-P-CCNC: 18 U/L (ref 10–35)
ANION GAP SERPL CALCULATED.3IONS-SCNC: 15 MMOL/L (ref 7–15)
AST SERPL W P-5'-P-CCNC: 21 U/L (ref 10–35)
BILIRUB SERPL-MCNC: 0.4 MG/DL
BUN SERPL-MCNC: 18.2 MG/DL (ref 8–23)
CALCIUM SERPL-MCNC: 9.5 MG/DL (ref 8.8–10.2)
CHLORIDE SERPL-SCNC: 108 MMOL/L (ref 98–107)
CHOLEST SERPL-MCNC: 156 MG/DL
CREAT SERPL-MCNC: 0.9 MG/DL (ref 0.51–0.95)
DEPRECATED HCO3 PLAS-SCNC: 23 MMOL/L (ref 22–29)
GFR SERPL CREATININE-BSD FRML MDRD: 61 ML/MIN/1.73M2
GLUCOSE SERPL-MCNC: 105 MG/DL (ref 70–99)
HDLC SERPL-MCNC: 44 MG/DL
LDLC SERPL CALC-MCNC: 68 MG/DL
NONHDLC SERPL-MCNC: 112 MG/DL
POTASSIUM SERPL-SCNC: 4.5 MMOL/L (ref 3.4–5.3)
PROT SERPL-MCNC: 6.9 G/DL (ref 6.4–8.3)
SODIUM SERPL-SCNC: 146 MMOL/L (ref 136–145)
T4 FREE SERPL-MCNC: 1.09 NG/DL (ref 0.9–1.7)
TRIGL SERPL-MCNC: 221 MG/DL
TSH SERPL DL<=0.005 MIU/L-ACNC: 8.17 UIU/ML (ref 0.3–4.2)

## 2023-02-13 PROCEDURE — 84439 ASSAY OF FREE THYROXINE: CPT | Mod: ORL | Performed by: FAMILY MEDICINE

## 2023-02-13 PROCEDURE — 84443 ASSAY THYROID STIM HORMONE: CPT | Mod: ORL | Performed by: FAMILY MEDICINE

## 2023-02-13 PROCEDURE — 80061 LIPID PANEL: CPT | Mod: ORL | Performed by: FAMILY MEDICINE

## 2023-02-13 PROCEDURE — 80053 COMPREHEN METABOLIC PANEL: CPT | Mod: ORL | Performed by: FAMILY MEDICINE

## 2023-02-21 ENCOUNTER — LAB REQUISITION (OUTPATIENT)
Dept: LAB | Facility: CLINIC | Age: 88
End: 2023-02-21
Payer: COMMERCIAL

## 2023-02-21 DIAGNOSIS — R20.9 UNSPECIFIED DISTURBANCES OF SKIN SENSATION: ICD-10-CM

## 2023-02-21 LAB — VIT B12 SERPL-MCNC: 1217 PG/ML (ref 232–1245)

## 2023-02-21 PROCEDURE — 82607 VITAMIN B-12: CPT | Mod: ORL | Performed by: FAMILY MEDICINE

## 2023-05-16 ENCOUNTER — LAB REQUISITION (OUTPATIENT)
Dept: LAB | Facility: CLINIC | Age: 88
End: 2023-05-16
Payer: COMMERCIAL

## 2023-05-16 DIAGNOSIS — E03.9 HYPOTHYROIDISM, UNSPECIFIED: ICD-10-CM

## 2023-05-16 PROCEDURE — 84443 ASSAY THYROID STIM HORMONE: CPT | Mod: ORL | Performed by: FAMILY MEDICINE

## 2023-05-17 LAB — TSH SERPL DL<=0.005 MIU/L-ACNC: 2.95 UIU/ML (ref 0.3–4.2)

## 2023-09-01 ENCOUNTER — LAB REQUISITION (OUTPATIENT)
Dept: LAB | Facility: CLINIC | Age: 88
End: 2023-09-01
Payer: COMMERCIAL

## 2023-09-01 DIAGNOSIS — E78.2 MIXED HYPERLIPIDEMIA: ICD-10-CM

## 2023-09-01 DIAGNOSIS — E03.9 HYPOTHYROIDISM, UNSPECIFIED: ICD-10-CM

## 2023-09-01 DIAGNOSIS — M81.0 AGE-RELATED OSTEOPOROSIS WITHOUT CURRENT PATHOLOGICAL FRACTURE: ICD-10-CM

## 2023-09-01 LAB
CHOLEST SERPL-MCNC: 142 MG/DL
HDLC SERPL-MCNC: 43 MG/DL
LDLC SERPL CALC-MCNC: 64 MG/DL
NONHDLC SERPL-MCNC: 99 MG/DL
TRIGL SERPL-MCNC: 175 MG/DL
TSH SERPL DL<=0.005 MIU/L-ACNC: 5.19 UIU/ML (ref 0.3–4.2)

## 2023-09-01 PROCEDURE — 80061 LIPID PANEL: CPT | Mod: ORL | Performed by: FAMILY MEDICINE

## 2023-09-01 PROCEDURE — 84443 ASSAY THYROID STIM HORMONE: CPT | Mod: ORL | Performed by: FAMILY MEDICINE

## 2023-09-01 PROCEDURE — 82306 VITAMIN D 25 HYDROXY: CPT | Mod: ORL | Performed by: FAMILY MEDICINE

## 2023-09-02 LAB — DEPRECATED CALCIDIOL+CALCIFEROL SERPL-MC: 67 UG/L (ref 20–75)

## 2023-09-17 ENCOUNTER — HEALTH MAINTENANCE LETTER (OUTPATIENT)
Age: 88
End: 2023-09-17

## 2023-10-26 ENCOUNTER — LAB REQUISITION (OUTPATIENT)
Dept: LAB | Facility: CLINIC | Age: 88
End: 2023-10-26
Payer: COMMERCIAL

## 2023-10-26 DIAGNOSIS — L65.9 NONSCARRING HAIR LOSS, UNSPECIFIED: ICD-10-CM

## 2023-10-26 PROCEDURE — 80053 COMPREHEN METABOLIC PANEL: CPT | Mod: ORL | Performed by: FAMILY MEDICINE

## 2023-10-26 PROCEDURE — 84443 ASSAY THYROID STIM HORMONE: CPT | Mod: ORL | Performed by: FAMILY MEDICINE

## 2023-10-27 LAB
ALBUMIN SERPL BCG-MCNC: 4.2 G/DL (ref 3.5–5.2)
ALP SERPL-CCNC: 59 U/L (ref 35–104)
ALT SERPL W P-5'-P-CCNC: 15 U/L (ref 0–50)
ANION GAP SERPL CALCULATED.3IONS-SCNC: 12 MMOL/L (ref 7–15)
AST SERPL W P-5'-P-CCNC: 18 U/L (ref 0–45)
BILIRUB SERPL-MCNC: 0.4 MG/DL
BUN SERPL-MCNC: 21.3 MG/DL (ref 8–23)
CALCIUM SERPL-MCNC: 9.1 MG/DL (ref 8.2–9.6)
CHLORIDE SERPL-SCNC: 106 MMOL/L (ref 98–107)
CREAT SERPL-MCNC: 0.93 MG/DL (ref 0.51–0.95)
DEPRECATED HCO3 PLAS-SCNC: 23 MMOL/L (ref 22–29)
EGFRCR SERPLBLD CKD-EPI 2021: 58 ML/MIN/1.73M2
GLUCOSE SERPL-MCNC: 99 MG/DL (ref 70–99)
POTASSIUM SERPL-SCNC: 4.5 MMOL/L (ref 3.4–5.3)
PROT SERPL-MCNC: 7.2 G/DL (ref 6.4–8.3)
SODIUM SERPL-SCNC: 141 MMOL/L (ref 135–145)
TSH SERPL DL<=0.005 MIU/L-ACNC: 3.1 UIU/ML (ref 0.3–4.2)

## 2023-12-26 ENCOUNTER — LAB REQUISITION (OUTPATIENT)
Dept: LAB | Facility: CLINIC | Age: 88
End: 2023-12-26
Payer: COMMERCIAL

## 2023-12-26 DIAGNOSIS — L65.9 NONSCARRING HAIR LOSS, UNSPECIFIED: ICD-10-CM

## 2023-12-26 LAB
ALBUMIN SERPL BCG-MCNC: 4.5 G/DL (ref 3.5–5.2)
ALP SERPL-CCNC: 64 U/L (ref 40–150)
ALT SERPL W P-5'-P-CCNC: 17 U/L (ref 0–50)
ANION GAP SERPL CALCULATED.3IONS-SCNC: 15 MMOL/L (ref 7–15)
AST SERPL W P-5'-P-CCNC: 21 U/L (ref 0–45)
BILIRUB SERPL-MCNC: 0.4 MG/DL
BUN SERPL-MCNC: 27.3 MG/DL (ref 8–23)
CALCIUM SERPL-MCNC: 9.8 MG/DL (ref 8.2–9.6)
CHLORIDE SERPL-SCNC: 105 MMOL/L (ref 98–107)
CREAT SERPL-MCNC: 1.04 MG/DL (ref 0.51–0.95)
DEPRECATED HCO3 PLAS-SCNC: 23 MMOL/L (ref 22–29)
EGFRCR SERPLBLD CKD-EPI 2021: 51 ML/MIN/1.73M2
GLUCOSE SERPL-MCNC: 127 MG/DL (ref 70–99)
POTASSIUM SERPL-SCNC: 4.5 MMOL/L (ref 3.4–5.3)
PROT SERPL-MCNC: 7.5 G/DL (ref 6.4–8.3)
SODIUM SERPL-SCNC: 143 MMOL/L (ref 135–145)

## 2023-12-26 PROCEDURE — 80053 COMPREHEN METABOLIC PANEL: CPT | Mod: ORL | Performed by: FAMILY MEDICINE

## 2024-01-16 ENCOUNTER — LAB REQUISITION (OUTPATIENT)
Dept: LAB | Facility: CLINIC | Age: 89
End: 2024-01-16
Payer: COMMERCIAL

## 2024-01-16 DIAGNOSIS — R10.32 LEFT LOWER QUADRANT PAIN: ICD-10-CM

## 2024-01-16 PROCEDURE — 87086 URINE CULTURE/COLONY COUNT: CPT | Mod: ORL | Performed by: NURSE PRACTITIONER

## 2024-01-17 LAB — BACTERIA UR CULT: NORMAL

## 2024-01-22 ENCOUNTER — LAB REQUISITION (OUTPATIENT)
Dept: LAB | Facility: CLINIC | Age: 89
End: 2024-01-22

## 2024-01-22 DIAGNOSIS — R10.30 LOWER ABDOMINAL PAIN, UNSPECIFIED: ICD-10-CM

## 2024-01-22 PROCEDURE — 87086 URINE CULTURE/COLONY COUNT: CPT | Performed by: NURSE PRACTITIONER

## 2024-01-22 PROCEDURE — 87186 SC STD MICRODIL/AGAR DIL: CPT | Performed by: NURSE PRACTITIONER

## 2024-01-24 LAB
BACTERIA UR CULT: ABNORMAL
BACTERIA UR CULT: ABNORMAL

## 2024-02-16 ENCOUNTER — LAB REQUISITION (OUTPATIENT)
Dept: LAB | Facility: CLINIC | Age: 89
End: 2024-02-16

## 2024-02-16 DIAGNOSIS — E03.9 HYPOTHYROIDISM, UNSPECIFIED: ICD-10-CM

## 2024-02-16 DIAGNOSIS — E78.2 MIXED HYPERLIPIDEMIA: ICD-10-CM

## 2024-02-16 DIAGNOSIS — L65.9 NONSCARRING HAIR LOSS, UNSPECIFIED: ICD-10-CM

## 2024-02-16 PROCEDURE — 80053 COMPREHEN METABOLIC PANEL: CPT | Performed by: FAMILY MEDICINE

## 2024-02-16 PROCEDURE — 84443 ASSAY THYROID STIM HORMONE: CPT | Performed by: FAMILY MEDICINE

## 2024-02-16 PROCEDURE — 80061 LIPID PANEL: CPT | Performed by: FAMILY MEDICINE

## 2024-02-17 LAB
ALBUMIN SERPL BCG-MCNC: 4.6 G/DL (ref 3.5–5.2)
ALP SERPL-CCNC: 68 U/L (ref 40–150)
ALT SERPL W P-5'-P-CCNC: 19 U/L (ref 0–50)
ANION GAP SERPL CALCULATED.3IONS-SCNC: 15 MMOL/L (ref 7–15)
AST SERPL W P-5'-P-CCNC: 24 U/L (ref 0–45)
BILIRUB SERPL-MCNC: 0.4 MG/DL
BUN SERPL-MCNC: 26 MG/DL (ref 8–23)
CALCIUM SERPL-MCNC: 9.9 MG/DL (ref 8.2–9.6)
CHLORIDE SERPL-SCNC: 105 MMOL/L (ref 98–107)
CHOLEST SERPL-MCNC: 158 MG/DL
CREAT SERPL-MCNC: 0.99 MG/DL (ref 0.51–0.95)
DEPRECATED HCO3 PLAS-SCNC: 22 MMOL/L (ref 22–29)
EGFRCR SERPLBLD CKD-EPI 2021: 54 ML/MIN/1.73M2
FASTING STATUS PATIENT QL REPORTED: ABNORMAL
GLUCOSE SERPL-MCNC: 109 MG/DL (ref 70–99)
HDLC SERPL-MCNC: 42 MG/DL
LDLC SERPL CALC-MCNC: 74 MG/DL
NONHDLC SERPL-MCNC: 116 MG/DL
POTASSIUM SERPL-SCNC: 4.8 MMOL/L (ref 3.4–5.3)
PROT SERPL-MCNC: 7.5 G/DL (ref 6.4–8.3)
SODIUM SERPL-SCNC: 142 MMOL/L (ref 135–145)
TRIGL SERPL-MCNC: 210 MG/DL
TSH SERPL DL<=0.005 MIU/L-ACNC: 4.23 UIU/ML (ref 0.3–4.2)

## 2024-05-02 ENCOUNTER — LAB REQUISITION (OUTPATIENT)
Dept: LAB | Facility: CLINIC | Age: 89
End: 2024-05-02

## 2024-05-02 DIAGNOSIS — L65.9 NONSCARRING HAIR LOSS, UNSPECIFIED: ICD-10-CM

## 2024-05-02 LAB
BASOPHILS # BLD AUTO: 0.1 10E3/UL (ref 0–0.2)
BASOPHILS NFR BLD AUTO: 1 %
EOSINOPHIL # BLD AUTO: 0.2 10E3/UL (ref 0–0.7)
EOSINOPHIL NFR BLD AUTO: 2 %
ERYTHROCYTE [DISTWIDTH] IN BLOOD BY AUTOMATED COUNT: 12.3 % (ref 10–15)
HCT VFR BLD AUTO: 45.5 % (ref 35–47)
HGB BLD-MCNC: 14.5 G/DL (ref 11.7–15.7)
IMM GRANULOCYTES # BLD: 0 10E3/UL
IMM GRANULOCYTES NFR BLD: 0 %
LYMPHOCYTES # BLD AUTO: 1.4 10E3/UL (ref 0.8–5.3)
LYMPHOCYTES NFR BLD AUTO: 20 %
MCH RBC QN AUTO: 30.9 PG (ref 26.5–33)
MCHC RBC AUTO-ENTMCNC: 31.9 G/DL (ref 31.5–36.5)
MCV RBC AUTO: 97 FL (ref 78–100)
MONOCYTES # BLD AUTO: 0.5 10E3/UL (ref 0–1.3)
MONOCYTES NFR BLD AUTO: 8 %
NEUTROPHILS # BLD AUTO: 4.6 10E3/UL (ref 1.6–8.3)
NEUTROPHILS NFR BLD AUTO: 69 %
NRBC # BLD AUTO: 0 10E3/UL
NRBC BLD AUTO-RTO: 0 /100
PLATELET # BLD AUTO: 256 10E3/UL (ref 150–450)
RBC # BLD AUTO: 4.7 10E6/UL (ref 3.8–5.2)
WBC # BLD AUTO: 6.6 10E3/UL (ref 4–11)

## 2024-05-02 PROCEDURE — 85025 COMPLETE CBC W/AUTO DIFF WBC: CPT | Performed by: FAMILY MEDICINE

## 2024-05-02 PROCEDURE — 80053 COMPREHEN METABOLIC PANEL: CPT | Performed by: FAMILY MEDICINE

## 2024-05-03 LAB
ALBUMIN SERPL BCG-MCNC: 4.8 G/DL (ref 3.5–5.2)
ALP SERPL-CCNC: 60 U/L (ref 40–150)
ALT SERPL W P-5'-P-CCNC: 18 U/L (ref 0–50)
ANION GAP SERPL CALCULATED.3IONS-SCNC: 13 MMOL/L (ref 7–15)
AST SERPL W P-5'-P-CCNC: 22 U/L (ref 0–45)
BILIRUB SERPL-MCNC: 0.4 MG/DL
BUN SERPL-MCNC: 28.3 MG/DL (ref 8–23)
CALCIUM SERPL-MCNC: 9.8 MG/DL (ref 8.2–9.6)
CHLORIDE SERPL-SCNC: 107 MMOL/L (ref 98–107)
CREAT SERPL-MCNC: 1.19 MG/DL (ref 0.51–0.95)
DEPRECATED HCO3 PLAS-SCNC: 23 MMOL/L (ref 22–29)
EGFRCR SERPLBLD CKD-EPI 2021: 43 ML/MIN/1.73M2
GLUCOSE SERPL-MCNC: 109 MG/DL (ref 70–99)
POTASSIUM SERPL-SCNC: 4.8 MMOL/L (ref 3.4–5.3)
PROT SERPL-MCNC: 7.5 G/DL (ref 6.4–8.3)
SODIUM SERPL-SCNC: 143 MMOL/L (ref 135–145)

## 2024-05-09 ENCOUNTER — LAB REQUISITION (OUTPATIENT)
Dept: LAB | Facility: CLINIC | Age: 89
End: 2024-05-09

## 2024-05-09 DIAGNOSIS — E83.52 HYPERCALCEMIA: ICD-10-CM

## 2024-05-09 PROCEDURE — 83970 ASSAY OF PARATHORMONE: CPT | Performed by: FAMILY MEDICINE

## 2024-05-09 PROCEDURE — 84100 ASSAY OF PHOSPHORUS: CPT | Performed by: FAMILY MEDICINE

## 2024-05-10 LAB
CALCIUM SERPL-MCNC: 9.8 MG/DL (ref 8.2–9.6)
CREAT SERPL-MCNC: 1.14 MG/DL (ref 0.51–0.95)
EGFRCR SERPLBLD CKD-EPI 2021: 45 ML/MIN/1.73M2
PHOSPHATE SERPL-MCNC: 3.9 MG/DL (ref 2.5–4.5)
PTH-INTACT SERPL-MCNC: 15 PG/ML (ref 15–65)

## 2024-07-02 ENCOUNTER — LAB REQUISITION (OUTPATIENT)
Dept: LAB | Facility: CLINIC | Age: 89
End: 2024-07-02

## 2024-07-02 DIAGNOSIS — L65.9 NONSCARRING HAIR LOSS, UNSPECIFIED: ICD-10-CM

## 2024-07-02 LAB
ALBUMIN SERPL BCG-MCNC: 4.5 G/DL (ref 3.5–5.2)
ALP SERPL-CCNC: 60 U/L (ref 40–150)
ALT SERPL W P-5'-P-CCNC: 17 U/L (ref 0–50)
ANION GAP SERPL CALCULATED.3IONS-SCNC: 13 MMOL/L (ref 7–15)
AST SERPL W P-5'-P-CCNC: 20 U/L (ref 0–45)
BASOPHILS # BLD AUTO: 0.1 10E3/UL (ref 0–0.2)
BASOPHILS NFR BLD AUTO: 1 %
BILIRUB SERPL-MCNC: 0.4 MG/DL
BUN SERPL-MCNC: 29.3 MG/DL (ref 8–23)
CALCIUM SERPL-MCNC: 9.7 MG/DL (ref 8.2–9.6)
CHLORIDE SERPL-SCNC: 105 MMOL/L (ref 98–107)
CREAT SERPL-MCNC: 1.16 MG/DL (ref 0.51–0.95)
DEPRECATED HCO3 PLAS-SCNC: 24 MMOL/L (ref 22–29)
EGFRCR SERPLBLD CKD-EPI 2021: 44 ML/MIN/1.73M2
EOSINOPHIL # BLD AUTO: 0.2 10E3/UL (ref 0–0.7)
EOSINOPHIL NFR BLD AUTO: 3 %
ERYTHROCYTE [DISTWIDTH] IN BLOOD BY AUTOMATED COUNT: 12.2 % (ref 10–15)
GLUCOSE SERPL-MCNC: 99 MG/DL (ref 70–99)
HCT VFR BLD AUTO: 46 % (ref 35–47)
HGB BLD-MCNC: 14.8 G/DL (ref 11.7–15.7)
IMM GRANULOCYTES # BLD: 0 10E3/UL
IMM GRANULOCYTES NFR BLD: 0 %
LYMPHOCYTES # BLD AUTO: 1.8 10E3/UL (ref 0.8–5.3)
LYMPHOCYTES NFR BLD AUTO: 23 %
MCH RBC QN AUTO: 31.4 PG (ref 26.5–33)
MCHC RBC AUTO-ENTMCNC: 32.2 G/DL (ref 31.5–36.5)
MCV RBC AUTO: 98 FL (ref 78–100)
MONOCYTES # BLD AUTO: 0.6 10E3/UL (ref 0–1.3)
MONOCYTES NFR BLD AUTO: 8 %
NEUTROPHILS # BLD AUTO: 5.1 10E3/UL (ref 1.6–8.3)
NEUTROPHILS NFR BLD AUTO: 65 %
NRBC # BLD AUTO: 0 10E3/UL
NRBC BLD AUTO-RTO: 0 /100
PLATELET # BLD AUTO: 246 10E3/UL (ref 150–450)
POTASSIUM SERPL-SCNC: 5.1 MMOL/L (ref 3.4–5.3)
PROT SERPL-MCNC: 7.4 G/DL (ref 6.4–8.3)
RBC # BLD AUTO: 4.72 10E6/UL (ref 3.8–5.2)
SODIUM SERPL-SCNC: 142 MMOL/L (ref 135–145)
WBC # BLD AUTO: 7.7 10E3/UL (ref 4–11)

## 2024-07-02 PROCEDURE — 85025 COMPLETE CBC W/AUTO DIFF WBC: CPT | Performed by: FAMILY MEDICINE

## 2024-07-02 PROCEDURE — 80053 COMPREHEN METABOLIC PANEL: CPT | Performed by: FAMILY MEDICINE

## 2024-08-30 ENCOUNTER — LAB REQUISITION (OUTPATIENT)
Dept: LAB | Facility: CLINIC | Age: 89
End: 2024-08-30

## 2024-08-30 DIAGNOSIS — L65.9 NONSCARRING HAIR LOSS, UNSPECIFIED: ICD-10-CM

## 2024-08-30 LAB
ALBUMIN SERPL BCG-MCNC: 4.6 G/DL (ref 3.5–5.2)
ALP SERPL-CCNC: 66 U/L (ref 40–150)
ALT SERPL W P-5'-P-CCNC: 17 U/L (ref 0–50)
ANION GAP SERPL CALCULATED.3IONS-SCNC: 11 MMOL/L (ref 7–15)
AST SERPL W P-5'-P-CCNC: 20 U/L (ref 0–45)
BASOPHILS # BLD AUTO: 0.1 10E3/UL (ref 0–0.2)
BASOPHILS NFR BLD AUTO: 1 %
BILIRUB SERPL-MCNC: 0.4 MG/DL
BUN SERPL-MCNC: 27.2 MG/DL (ref 8–23)
CALCIUM SERPL-MCNC: 9.9 MG/DL (ref 8.8–10.4)
CHLORIDE SERPL-SCNC: 103 MMOL/L (ref 98–107)
CREAT SERPL-MCNC: 1.16 MG/DL (ref 0.51–0.95)
EGFRCR SERPLBLD CKD-EPI 2021: 44 ML/MIN/1.73M2
EOSINOPHIL # BLD AUTO: 0.3 10E3/UL (ref 0–0.7)
EOSINOPHIL NFR BLD AUTO: 4 %
ERYTHROCYTE [DISTWIDTH] IN BLOOD BY AUTOMATED COUNT: 12.1 % (ref 10–15)
GLUCOSE SERPL-MCNC: 113 MG/DL (ref 70–99)
HCO3 SERPL-SCNC: 24 MMOL/L (ref 22–29)
HCT VFR BLD AUTO: 46.5 % (ref 35–47)
HGB BLD-MCNC: 14.8 G/DL (ref 11.7–15.7)
IMM GRANULOCYTES # BLD: 0 10E3/UL
IMM GRANULOCYTES NFR BLD: 0 %
LYMPHOCYTES # BLD AUTO: 1.4 10E3/UL (ref 0.8–5.3)
LYMPHOCYTES NFR BLD AUTO: 19 %
MCH RBC QN AUTO: 31.4 PG (ref 26.5–33)
MCHC RBC AUTO-ENTMCNC: 31.8 G/DL (ref 31.5–36.5)
MCV RBC AUTO: 99 FL (ref 78–100)
MONOCYTES # BLD AUTO: 0.7 10E3/UL (ref 0–1.3)
MONOCYTES NFR BLD AUTO: 9 %
NEUTROPHILS # BLD AUTO: 4.7 10E3/UL (ref 1.6–8.3)
NEUTROPHILS NFR BLD AUTO: 67 %
NRBC # BLD AUTO: 0 10E3/UL
NRBC BLD AUTO-RTO: 0 /100
PLATELET # BLD AUTO: 265 10E3/UL (ref 150–450)
POTASSIUM SERPL-SCNC: 4.8 MMOL/L (ref 3.4–5.3)
PROT SERPL-MCNC: 7.5 G/DL (ref 6.4–8.3)
RBC # BLD AUTO: 4.71 10E6/UL (ref 3.8–5.2)
SODIUM SERPL-SCNC: 138 MMOL/L (ref 135–145)
WBC # BLD AUTO: 7.1 10E3/UL (ref 4–11)

## 2024-08-30 PROCEDURE — 82040 ASSAY OF SERUM ALBUMIN: CPT | Performed by: FAMILY MEDICINE

## 2024-08-30 PROCEDURE — 85025 COMPLETE CBC W/AUTO DIFF WBC: CPT | Performed by: FAMILY MEDICINE

## 2024-09-12 ENCOUNTER — LAB REQUISITION (OUTPATIENT)
Dept: LAB | Facility: CLINIC | Age: 89
End: 2024-09-12

## 2024-09-12 DIAGNOSIS — N18.31 CHRONIC KIDNEY DISEASE, STAGE 3A (H): ICD-10-CM

## 2024-09-12 DIAGNOSIS — E78.2 MIXED HYPERLIPIDEMIA: ICD-10-CM

## 2024-09-12 DIAGNOSIS — E03.9 HYPOTHYROIDISM, UNSPECIFIED: ICD-10-CM

## 2024-09-12 LAB
ALBUMIN SERPL BCG-MCNC: 4.4 G/DL (ref 3.5–5.2)
ALP SERPL-CCNC: 58 U/L (ref 40–150)
ALT SERPL W P-5'-P-CCNC: 14 U/L (ref 0–50)
ANION GAP SERPL CALCULATED.3IONS-SCNC: 10 MMOL/L (ref 7–15)
AST SERPL W P-5'-P-CCNC: 18 U/L (ref 0–45)
BASOPHILS # BLD AUTO: 0.1 10E3/UL (ref 0–0.2)
BASOPHILS NFR BLD AUTO: 1 %
BILIRUB SERPL-MCNC: 0.4 MG/DL
BUN SERPL-MCNC: 27.9 MG/DL (ref 8–23)
CALCIUM SERPL-MCNC: 9.6 MG/DL (ref 8.8–10.4)
CHLORIDE SERPL-SCNC: 107 MMOL/L (ref 98–107)
CHOLEST SERPL-MCNC: 143 MG/DL
CREAT SERPL-MCNC: 1.2 MG/DL (ref 0.51–0.95)
EGFRCR SERPLBLD CKD-EPI 2021: 43 ML/MIN/1.73M2
EOSINOPHIL # BLD AUTO: 0.3 10E3/UL (ref 0–0.7)
EOSINOPHIL NFR BLD AUTO: 4 %
ERYTHROCYTE [DISTWIDTH] IN BLOOD BY AUTOMATED COUNT: 12.1 % (ref 10–15)
FASTING STATUS PATIENT QL REPORTED: ABNORMAL
FASTING STATUS PATIENT QL REPORTED: ABNORMAL
GLUCOSE SERPL-MCNC: 101 MG/DL (ref 70–99)
HCO3 SERPL-SCNC: 25 MMOL/L (ref 22–29)
HCT VFR BLD AUTO: 45.2 % (ref 35–47)
HDLC SERPL-MCNC: 38 MG/DL
HGB BLD-MCNC: 14.4 G/DL (ref 11.7–15.7)
IMM GRANULOCYTES # BLD: 0 10E3/UL
IMM GRANULOCYTES NFR BLD: 0 %
LDLC SERPL CALC-MCNC: 69 MG/DL
LYMPHOCYTES # BLD AUTO: 1.4 10E3/UL (ref 0.8–5.3)
LYMPHOCYTES NFR BLD AUTO: 22 %
MCH RBC QN AUTO: 31.6 PG (ref 26.5–33)
MCHC RBC AUTO-ENTMCNC: 31.9 G/DL (ref 31.5–36.5)
MCV RBC AUTO: 99 FL (ref 78–100)
MONOCYTES # BLD AUTO: 0.6 10E3/UL (ref 0–1.3)
MONOCYTES NFR BLD AUTO: 9 %
NEUTROPHILS # BLD AUTO: 4.2 10E3/UL (ref 1.6–8.3)
NEUTROPHILS NFR BLD AUTO: 64 %
NONHDLC SERPL-MCNC: 105 MG/DL
NRBC # BLD AUTO: 0 10E3/UL
NRBC BLD AUTO-RTO: 0 /100
PLATELET # BLD AUTO: 250 10E3/UL (ref 150–450)
POTASSIUM SERPL-SCNC: 4.7 MMOL/L (ref 3.4–5.3)
PROT SERPL-MCNC: 7.5 G/DL (ref 6.4–8.3)
RBC # BLD AUTO: 4.55 10E6/UL (ref 3.8–5.2)
SODIUM SERPL-SCNC: 142 MMOL/L (ref 135–145)
TRIGL SERPL-MCNC: 178 MG/DL
TSH SERPL DL<=0.005 MIU/L-ACNC: 3.52 UIU/ML (ref 0.3–4.2)
WBC # BLD AUTO: 6.5 10E3/UL (ref 4–11)

## 2024-09-12 PROCEDURE — 80061 LIPID PANEL: CPT | Performed by: FAMILY MEDICINE

## 2024-09-12 PROCEDURE — 80053 COMPREHEN METABOLIC PANEL: CPT | Performed by: FAMILY MEDICINE

## 2024-09-12 PROCEDURE — 84443 ASSAY THYROID STIM HORMONE: CPT | Performed by: FAMILY MEDICINE

## 2024-09-12 PROCEDURE — 85025 COMPLETE CBC W/AUTO DIFF WBC: CPT | Performed by: FAMILY MEDICINE

## 2024-10-18 ENCOUNTER — LAB REQUISITION (OUTPATIENT)
Dept: LAB | Facility: CLINIC | Age: 89
End: 2024-10-18

## 2024-10-18 DIAGNOSIS — L65.9 NONSCARRING HAIR LOSS, UNSPECIFIED: ICD-10-CM

## 2024-10-18 LAB
ALBUMIN SERPL BCG-MCNC: 4.4 G/DL (ref 3.5–5.2)
ALP SERPL-CCNC: 58 U/L (ref 40–150)
ALT SERPL W P-5'-P-CCNC: 15 U/L (ref 0–50)
ANION GAP SERPL CALCULATED.3IONS-SCNC: 11 MMOL/L (ref 7–15)
AST SERPL W P-5'-P-CCNC: 18 U/L (ref 0–45)
BILIRUB SERPL-MCNC: 0.4 MG/DL
BUN SERPL-MCNC: 25.2 MG/DL (ref 8–23)
CALCIUM SERPL-MCNC: 9.8 MG/DL (ref 8.8–10.4)
CHLORIDE SERPL-SCNC: 105 MMOL/L (ref 98–107)
CREAT SERPL-MCNC: 1.17 MG/DL (ref 0.51–0.95)
EGFRCR SERPLBLD CKD-EPI 2021: 44 ML/MIN/1.73M2
GLUCOSE SERPL-MCNC: 99 MG/DL (ref 70–99)
HCO3 SERPL-SCNC: 24 MMOL/L (ref 22–29)
POTASSIUM SERPL-SCNC: 5.1 MMOL/L (ref 3.4–5.3)
PROT SERPL-MCNC: 7.2 G/DL (ref 6.4–8.3)
SODIUM SERPL-SCNC: 140 MMOL/L (ref 135–145)

## 2024-10-18 PROCEDURE — 80053 COMPREHEN METABOLIC PANEL: CPT | Performed by: FAMILY MEDICINE

## 2024-10-18 PROCEDURE — 83497 ASSAY OF 5-HIAA: CPT | Performed by: FAMILY MEDICINE

## 2024-10-21 LAB
5HIAA & CREATININE UR-IMP: NORMAL
5OH-INDOLEACETATE 24H UR-MCNC: 10.1 MG/L
5OH-INDOLEACETATE 24H UR-MRATE: NORMAL MG/D
5OH-INDOLEACETATE/CREAT 24H UR: 5 MG/GCR
CREAT 24H UR-MRATE: NORMAL MG/D
CREAT UR-MCNC: 211 MG/DL

## 2025-04-03 ENCOUNTER — LAB REQUISITION (OUTPATIENT)
Dept: LAB | Facility: CLINIC | Age: OVER 89
End: 2025-04-03

## 2025-04-03 DIAGNOSIS — E78.2 MIXED HYPERLIPIDEMIA: ICD-10-CM

## 2025-04-03 DIAGNOSIS — E03.9 HYPOTHYROIDISM, UNSPECIFIED: ICD-10-CM

## 2025-04-03 LAB
ALBUMIN SERPL BCG-MCNC: 4.4 G/DL (ref 3.5–5.2)
ALP SERPL-CCNC: 57 U/L (ref 40–150)
ALT SERPL W P-5'-P-CCNC: 17 U/L (ref 0–50)
ANION GAP SERPL CALCULATED.3IONS-SCNC: 13 MMOL/L (ref 7–15)
AST SERPL W P-5'-P-CCNC: 20 U/L (ref 0–45)
BILIRUB SERPL-MCNC: 0.4 MG/DL
BUN SERPL-MCNC: 22.1 MG/DL (ref 8–23)
CALCIUM SERPL-MCNC: 9.7 MG/DL (ref 8.8–10.4)
CHLORIDE SERPL-SCNC: 105 MMOL/L (ref 98–107)
CHOLEST SERPL-MCNC: 149 MG/DL
CREAT SERPL-MCNC: 1.04 MG/DL (ref 0.51–0.95)
EGFRCR SERPLBLD CKD-EPI 2021: 50 ML/MIN/1.73M2
FASTING STATUS PATIENT QL REPORTED: YES
FASTING STATUS PATIENT QL REPORTED: YES
GLUCOSE SERPL-MCNC: 107 MG/DL (ref 70–99)
HCO3 SERPL-SCNC: 23 MMOL/L (ref 22–29)
HDLC SERPL-MCNC: 42 MG/DL
LDLC SERPL CALC-MCNC: 71 MG/DL
NONHDLC SERPL-MCNC: 107 MG/DL
POTASSIUM SERPL-SCNC: 4.3 MMOL/L (ref 3.4–5.3)
PROT SERPL-MCNC: 7 G/DL (ref 6.4–8.3)
SODIUM SERPL-SCNC: 141 MMOL/L (ref 135–145)
TRIGL SERPL-MCNC: 178 MG/DL
TSH SERPL DL<=0.005 MIU/L-ACNC: 3.77 UIU/ML (ref 0.3–4.2)

## 2025-04-03 PROCEDURE — 84520 ASSAY OF UREA NITROGEN: CPT | Performed by: FAMILY MEDICINE

## 2025-04-03 PROCEDURE — 84443 ASSAY THYROID STIM HORMONE: CPT | Performed by: FAMILY MEDICINE

## 2025-04-03 PROCEDURE — 80061 LIPID PANEL: CPT | Performed by: FAMILY MEDICINE

## 2025-04-03 PROCEDURE — 82310 ASSAY OF CALCIUM: CPT | Performed by: FAMILY MEDICINE

## 2025-08-29 ENCOUNTER — HOSPITAL ENCOUNTER (EMERGENCY)
Facility: HOSPITAL | Age: OVER 89
Discharge: HOME | End: 2025-08-29
Attending: EMERGENCY MEDICINE | Admitting: EMERGENCY MEDICINE
Payer: COMMERCIAL

## 2025-08-29 VITALS
OXYGEN SATURATION: 95 % | SYSTOLIC BLOOD PRESSURE: 138 MMHG | TEMPERATURE: 97.5 F | WEIGHT: 131.2 LBS | HEART RATE: 61 BPM | BODY MASS INDEX: 25.62 KG/M2 | DIASTOLIC BLOOD PRESSURE: 65 MMHG | RESPIRATION RATE: 23 BRPM

## 2025-08-29 DIAGNOSIS — H81.12 BENIGN PAROXYSMAL POSITIONAL VERTIGO OF LEFT EAR: Primary | ICD-10-CM

## 2025-08-29 PROCEDURE — 99283 EMERGENCY DEPT VISIT LOW MDM: CPT | Performed by: EMERGENCY MEDICINE

## 2025-08-29 PROCEDURE — 250N000013 HC RX MED GY IP 250 OP 250 PS 637: Performed by: EMERGENCY MEDICINE

## 2025-08-29 RX ORDER — MECLIZINE HYDROCHLORIDE 25 MG/1
25 TABLET ORAL ONCE
Status: COMPLETED | OUTPATIENT
Start: 2025-08-29 | End: 2025-08-29

## 2025-08-29 RX ORDER — MECLIZINE HYDROCHLORIDE 25 MG/1
25 TABLET ORAL 3 TIMES DAILY PRN
Qty: 20 TABLET | Refills: 0 | Status: SHIPPED | OUTPATIENT
Start: 2025-08-29

## 2025-08-29 RX ADMIN — MECLIZINE HYDROCHLORIDE 25 MG: 25 TABLET ORAL at 10:21

## 2025-08-29 ASSESSMENT — ENCOUNTER SYMPTOMS
HEADACHES: 0
DIZZINESS: 1
NUMBNESS: 0

## 2025-08-29 ASSESSMENT — ACTIVITIES OF DAILY LIVING (ADL)
ADLS_ACUITY_SCORE: 41
ADLS_ACUITY_SCORE: 41